# Patient Record
Sex: FEMALE | Race: WHITE | NOT HISPANIC OR LATINO | URBAN - METROPOLITAN AREA
[De-identification: names, ages, dates, MRNs, and addresses within clinical notes are randomized per-mention and may not be internally consistent; named-entity substitution may affect disease eponyms.]

---

## 2021-06-08 ENCOUNTER — INPATIENT (INPATIENT)
Facility: HOSPITAL | Age: 36
LOS: 2 days | Discharge: HOME | End: 2021-06-11
Attending: OBSTETRICS & GYNECOLOGY | Admitting: OBSTETRICS & GYNECOLOGY
Payer: COMMERCIAL

## 2021-06-09 VITALS
WEIGHT: 138.89 LBS | RESPIRATION RATE: 16 BRPM | HEIGHT: 61 IN | HEART RATE: 66 BPM | TEMPERATURE: 98 F | DIASTOLIC BLOOD PRESSURE: 80 MMHG | OXYGEN SATURATION: 100 % | SYSTOLIC BLOOD PRESSURE: 126 MMHG

## 2021-06-09 LAB
BASOPHILS # BLD AUTO: 0.04 K/UL — SIGNIFICANT CHANGE UP (ref 0–0.2)
BASOPHILS NFR BLD AUTO: 0.3 % — SIGNIFICANT CHANGE UP (ref 0–2)
BLD GP AB SCN SERPL QL: NEGATIVE — SIGNIFICANT CHANGE UP
COVID-19 SPIKE DOMAIN AB INTERP: POSITIVE
COVID-19 SPIKE DOMAIN ANTIBODY RESULT: 174 U/ML — HIGH
EOSINOPHIL # BLD AUTO: 0.05 K/UL — SIGNIFICANT CHANGE UP (ref 0–0.5)
EOSINOPHIL NFR BLD AUTO: 0.4 % — SIGNIFICANT CHANGE UP (ref 0–6)
HCT VFR BLD CALC: 37.5 % — SIGNIFICANT CHANGE UP (ref 34.5–45)
HGB BLD-MCNC: 13.3 G/DL — SIGNIFICANT CHANGE UP (ref 11.5–15.5)
IMM GRANULOCYTES NFR BLD AUTO: 0.4 % — SIGNIFICANT CHANGE UP (ref 0–1.5)
LYMPHOCYTES # BLD AUTO: 23.8 % — SIGNIFICANT CHANGE UP (ref 13–44)
LYMPHOCYTES # BLD AUTO: 3.1 K/UL — SIGNIFICANT CHANGE UP (ref 1–3.3)
MCHC RBC-ENTMCNC: 32 PG — SIGNIFICANT CHANGE UP (ref 27–34)
MCHC RBC-ENTMCNC: 35.5 GM/DL — SIGNIFICANT CHANGE UP (ref 32–36)
MCV RBC AUTO: 90.4 FL — SIGNIFICANT CHANGE UP (ref 80–100)
MONOCYTES # BLD AUTO: 0.98 K/UL — HIGH (ref 0–0.9)
MONOCYTES NFR BLD AUTO: 7.5 % — SIGNIFICANT CHANGE UP (ref 2–14)
NEUTROPHILS # BLD AUTO: 8.78 K/UL — HIGH (ref 1.8–7.4)
NEUTROPHILS NFR BLD AUTO: 67.6 % — SIGNIFICANT CHANGE UP (ref 43–77)
NRBC # BLD: 0 /100 WBCS — SIGNIFICANT CHANGE UP (ref 0–0)
PLATELET # BLD AUTO: 202 K/UL — SIGNIFICANT CHANGE UP (ref 150–400)
RBC # BLD: 4.15 M/UL — SIGNIFICANT CHANGE UP (ref 3.8–5.2)
RBC # FLD: 13 % — SIGNIFICANT CHANGE UP (ref 10.3–14.5)
RH IG SCN BLD-IMP: POSITIVE — SIGNIFICANT CHANGE UP
RH IG SCN BLD-IMP: POSITIVE — SIGNIFICANT CHANGE UP
SARS-COV-2 IGG+IGM SERPL QL IA: 174 U/ML — HIGH
SARS-COV-2 IGG+IGM SERPL QL IA: POSITIVE
T PALLIDUM AB TITR SER: NEGATIVE — SIGNIFICANT CHANGE UP
WBC # BLD: 13 K/UL — HIGH (ref 3.8–10.5)
WBC # FLD AUTO: 13 K/UL — HIGH (ref 3.8–10.5)

## 2021-06-09 RX ORDER — MAGNESIUM HYDROXIDE 400 MG/1
30 TABLET, CHEWABLE ORAL
Refills: 0 | Status: DISCONTINUED | OUTPATIENT
Start: 2021-06-09 | End: 2021-06-11

## 2021-06-09 RX ORDER — IBUPROFEN 200 MG
600 TABLET ORAL EVERY 6 HOURS
Refills: 0 | Status: COMPLETED | OUTPATIENT
Start: 2021-06-09 | End: 2022-05-08

## 2021-06-09 RX ORDER — OXYCODONE HYDROCHLORIDE 5 MG/1
5 TABLET ORAL
Refills: 0 | Status: DISCONTINUED | OUTPATIENT
Start: 2021-06-09 | End: 2021-06-11

## 2021-06-09 RX ORDER — LANOLIN
1 OINTMENT (GRAM) TOPICAL EVERY 6 HOURS
Refills: 0 | Status: DISCONTINUED | OUTPATIENT
Start: 2021-06-09 | End: 2021-06-11

## 2021-06-09 RX ORDER — AER TRAVELER 0.5 G/1
1 SOLUTION RECTAL; TOPICAL EVERY 4 HOURS
Refills: 0 | Status: DISCONTINUED | OUTPATIENT
Start: 2021-06-09 | End: 2021-06-11

## 2021-06-09 RX ORDER — ACETAMINOPHEN 500 MG
975 TABLET ORAL
Refills: 0 | Status: DISCONTINUED | OUTPATIENT
Start: 2021-06-09 | End: 2021-06-11

## 2021-06-09 RX ORDER — TETANUS TOXOID, REDUCED DIPHTHERIA TOXOID AND ACELLULAR PERTUSSIS VACCINE, ADSORBED 5; 2.5; 8; 8; 2.5 [IU]/.5ML; [IU]/.5ML; UG/.5ML; UG/.5ML; UG/.5ML
0.5 SUSPENSION INTRAMUSCULAR ONCE
Refills: 0 | Status: DISCONTINUED | OUTPATIENT
Start: 2021-06-09 | End: 2021-06-11

## 2021-06-09 RX ORDER — OXYTOCIN 10 UNIT/ML
20 VIAL (ML) INJECTION ONCE
Refills: 0 | Status: DISCONTINUED | OUTPATIENT
Start: 2021-06-09 | End: 2021-06-09

## 2021-06-09 RX ORDER — SIMETHICONE 80 MG/1
80 TABLET, CHEWABLE ORAL EVERY 4 HOURS
Refills: 0 | Status: DISCONTINUED | OUTPATIENT
Start: 2021-06-09 | End: 2021-06-11

## 2021-06-09 RX ORDER — BENZOCAINE 10 %
1 GEL (GRAM) MUCOUS MEMBRANE EVERY 6 HOURS
Refills: 0 | Status: DISCONTINUED | OUTPATIENT
Start: 2021-06-09 | End: 2021-06-11

## 2021-06-09 RX ORDER — IBUPROFEN 200 MG
600 TABLET ORAL EVERY 6 HOURS
Refills: 0 | Status: DISCONTINUED | OUTPATIENT
Start: 2021-06-09 | End: 2021-06-11

## 2021-06-09 RX ORDER — SODIUM CHLORIDE 9 MG/ML
1000 INJECTION, SOLUTION INTRAVENOUS
Refills: 0 | Status: DISCONTINUED | OUTPATIENT
Start: 2021-06-09 | End: 2021-06-09

## 2021-06-09 RX ORDER — OXYTOCIN 10 UNIT/ML
333.33 VIAL (ML) INJECTION
Qty: 20 | Refills: 0 | Status: DISCONTINUED | OUTPATIENT
Start: 2021-06-09 | End: 2021-06-09

## 2021-06-09 RX ORDER — OXYTOCIN 10 UNIT/ML
333.33 VIAL (ML) INJECTION
Qty: 20 | Refills: 0 | Status: DISCONTINUED | OUTPATIENT
Start: 2021-06-09 | End: 2021-06-11

## 2021-06-09 RX ORDER — FENTANYL/BUPIVACAINE/NS/PF 2MCG/ML-.1
250 PLASTIC BAG, INJECTION (ML) INJECTION
Refills: 0 | Status: DISCONTINUED | OUTPATIENT
Start: 2021-06-09 | End: 2021-06-11

## 2021-06-09 RX ORDER — SODIUM CHLORIDE 9 MG/ML
500 INJECTION, SOLUTION INTRAVENOUS
Refills: 0 | Status: DISCONTINUED | OUTPATIENT
Start: 2021-06-09 | End: 2021-06-11

## 2021-06-09 RX ORDER — DIBUCAINE 1 %
1 OINTMENT (GRAM) RECTAL EVERY 6 HOURS
Refills: 0 | Status: DISCONTINUED | OUTPATIENT
Start: 2021-06-09 | End: 2021-06-11

## 2021-06-09 RX ORDER — SODIUM CHLORIDE 9 MG/ML
3 INJECTION INTRAMUSCULAR; INTRAVENOUS; SUBCUTANEOUS EVERY 8 HOURS
Refills: 0 | Status: DISCONTINUED | OUTPATIENT
Start: 2021-06-09 | End: 2021-06-11

## 2021-06-09 RX ORDER — OXYTOCIN 10 UNIT/ML
1 VIAL (ML) INJECTION
Qty: 30 | Refills: 0 | Status: DISCONTINUED | OUTPATIENT
Start: 2021-06-09 | End: 2021-06-11

## 2021-06-09 RX ORDER — HYDROCORTISONE 1 %
1 OINTMENT (GRAM) TOPICAL EVERY 6 HOURS
Refills: 0 | Status: DISCONTINUED | OUTPATIENT
Start: 2021-06-09 | End: 2021-06-11

## 2021-06-09 RX ORDER — SODIUM CHLORIDE 9 MG/ML
500 INJECTION, SOLUTION INTRAVENOUS ONCE
Refills: 0 | Status: COMPLETED | OUTPATIENT
Start: 2021-06-09 | End: 2021-06-09

## 2021-06-09 RX ORDER — OXYCODONE HYDROCHLORIDE 5 MG/1
5 TABLET ORAL ONCE
Refills: 0 | Status: DISCONTINUED | OUTPATIENT
Start: 2021-06-09 | End: 2021-06-11

## 2021-06-09 RX ORDER — CITRIC ACID/SODIUM CITRATE 300-500 MG
15 SOLUTION, ORAL ORAL EVERY 6 HOURS
Refills: 0 | Status: DISCONTINUED | OUTPATIENT
Start: 2021-06-09 | End: 2021-06-09

## 2021-06-09 RX ORDER — DIPHENHYDRAMINE HCL 50 MG
25 CAPSULE ORAL EVERY 6 HOURS
Refills: 0 | Status: DISCONTINUED | OUTPATIENT
Start: 2021-06-09 | End: 2021-06-11

## 2021-06-09 RX ORDER — PRAMOXINE HYDROCHLORIDE 150 MG/15G
1 AEROSOL, FOAM RECTAL EVERY 4 HOURS
Refills: 0 | Status: DISCONTINUED | OUTPATIENT
Start: 2021-06-09 | End: 2021-06-11

## 2021-06-09 RX ORDER — OXYTOCIN 10 UNIT/ML
2 VIAL (ML) INJECTION
Qty: 30 | Refills: 0 | Status: DISCONTINUED | OUTPATIENT
Start: 2021-06-09 | End: 2021-06-09

## 2021-06-09 RX ADMIN — Medication 975 MILLIGRAM(S): at 19:00

## 2021-06-09 RX ADMIN — Medication 975 MILLIGRAM(S): at 18:10

## 2021-06-09 RX ADMIN — Medication 1 MILLIUNIT(S)/MIN: at 02:42

## 2021-06-09 RX ADMIN — SODIUM CHLORIDE 125 MILLILITER(S): 9 INJECTION, SOLUTION INTRAVENOUS at 01:00

## 2021-06-09 RX ADMIN — SODIUM CHLORIDE 3 MILLILITER(S): 9 INJECTION INTRAMUSCULAR; INTRAVENOUS; SUBCUTANEOUS at 22:00

## 2021-06-09 RX ADMIN — Medication 1000 MILLIUNIT(S)/MIN: at 17:01

## 2021-06-09 RX ADMIN — SODIUM CHLORIDE 1000 MILLILITER(S): 9 INJECTION, SOLUTION INTRAVENOUS at 11:33

## 2021-06-10 RX ADMIN — Medication 600 MILLIGRAM(S): at 09:30

## 2021-06-10 RX ADMIN — Medication 1 TABLET(S): at 11:36

## 2021-06-10 RX ADMIN — Medication 1 APPLICATION(S): at 06:01

## 2021-06-10 RX ADMIN — Medication 975 MILLIGRAM(S): at 07:00

## 2021-06-10 RX ADMIN — Medication 975 MILLIGRAM(S): at 12:00

## 2021-06-10 RX ADMIN — Medication 600 MILLIGRAM(S): at 03:40

## 2021-06-10 RX ADMIN — SODIUM CHLORIDE 3 MILLILITER(S): 9 INJECTION INTRAMUSCULAR; INTRAVENOUS; SUBCUTANEOUS at 06:00

## 2021-06-10 RX ADMIN — Medication 975 MILLIGRAM(S): at 23:12

## 2021-06-10 RX ADMIN — Medication 600 MILLIGRAM(S): at 22:42

## 2021-06-10 RX ADMIN — Medication 600 MILLIGRAM(S): at 15:30

## 2021-06-10 RX ADMIN — Medication 975 MILLIGRAM(S): at 12:30

## 2021-06-10 RX ADMIN — Medication 975 MILLIGRAM(S): at 06:00

## 2021-06-10 RX ADMIN — Medication 975 MILLIGRAM(S): at 18:00

## 2021-06-10 RX ADMIN — Medication 600 MILLIGRAM(S): at 21:37

## 2021-06-10 RX ADMIN — Medication 600 MILLIGRAM(S): at 09:00

## 2021-06-10 RX ADMIN — Medication 600 MILLIGRAM(S): at 15:00

## 2021-06-10 RX ADMIN — Medication 975 MILLIGRAM(S): at 18:30

## 2021-06-10 RX ADMIN — Medication 600 MILLIGRAM(S): at 02:40

## 2021-06-10 NOTE — DISCHARGE NOTE OB - CARE PROVIDER_API CALL
Laure Ta  OBSTETRICS AND GYNECOLOGY  [address]  [place] [address]  Phone: [phone]  Fax: [phone]  Follow Up Time:

## 2021-06-10 NOTE — PROGRESS NOTE ADULT - ASSESSMENT
A/P yo 35y  s/p , PP#1 , stable  1. Pain: OPM  2. GI: Reg  3. :  Voiding  4. DVT prophylaxis: SCDs, ambulation  5. Dispo: PP#1 or 2 unless otherwise specified

## 2021-06-10 NOTE — LACTATION INITIAL EVALUATION - NS LACT CON REASON FOR REQ
Met dyad at ~20 hours. Positioning and latch strategies taught, and baby was able to latch deeply, sucking rhythmically between periods of rest. Observed 20+ minutes on left, 5+ on right. Breastfeeding basics and normal  behavior reviewed, including cluster feeding/second night syndrome. Reviewed signs that baby is getting enough. Encouraged frequent SSC and to breastfeed in response to cues at least 8-12x/day. Answered all questions for mother and father. Encouraged to call for additional support as needed from RN or LC. Will refer for tele consult after discharge./primaparous mom

## 2021-06-10 NOTE — DISCHARGE NOTE OB - MATERIALS PROVIDED
Vaccinations/[facility]  Screening Program/  Immunization Record/Breastfeeding Log/Breastfeeding Mother’s Support Group Information/Guide to Postpartum Care/[facility] Hearing Screen Program/Back To Sleep Handout/Shaken Baby Prevention Handout/Birth Certificate Instructions

## 2021-06-10 NOTE — DISCHARGE NOTE OB - PATIENT PORTAL LINK FT
You can access the FollowMyHealth Patient Portal offered by [facility] by registering at the following website: [email or web address]. By joining [facility]’s FollowMyHealth portal, you will also be able to view your health information using other applications (apps) compatible with our system.

## 2021-06-10 NOTE — LACTATION INITIAL EVALUATION - LATCH
[facility] and follow-up neuropsych testing to guide treatment  Trial of guanfacine to manage behaviors and aggression. Follow-up established care provider for med refills and continued management  Follow-up [facility]-referred in-home therapy/services  Follow-up established care and services   normal latch

## 2021-06-11 VITALS
DIASTOLIC BLOOD PRESSURE: 65 MMHG | RESPIRATION RATE: 20 BRPM | TEMPERATURE: 98 F | HEART RATE: 73 BPM | SYSTOLIC BLOOD PRESSURE: 123 MMHG | OXYGEN SATURATION: 98 %

## 2021-06-11 PROCEDURE — 36415 COLL VENOUS BLD VENIPUNCTURE: CPT

## 2021-06-11 PROCEDURE — 59050 FETAL MONITOR W/REPORT: CPT

## 2021-06-11 PROCEDURE — 86901 BLOOD TYPING SEROLOGIC RH(D): CPT

## 2021-06-11 PROCEDURE — 86850 RBC ANTIBODY SCREEN: CPT

## 2021-06-11 PROCEDURE — 86780 TREPONEMA PALLIDUM: CPT

## 2021-06-11 PROCEDURE — 86769 SARS-COV-2 COVID-19 ANTIBODY: CPT

## 2021-06-11 PROCEDURE — 86900 BLOOD TYPING SEROLOGIC ABO: CPT

## 2021-06-11 PROCEDURE — 85025 COMPLETE CBC W/AUTO DIFF WBC: CPT

## 2021-06-11 RX ADMIN — Medication 600 MILLIGRAM(S): at 10:00

## 2021-06-11 RX ADMIN — Medication 975 MILLIGRAM(S): at 06:30

## 2021-06-11 RX ADMIN — Medication 975 MILLIGRAM(S): at 05:41

## 2021-06-11 RX ADMIN — Medication 1 TABLET(S): at 13:04

## 2021-06-11 RX ADMIN — Medication 975 MILLIGRAM(S): at 00:06

## 2021-06-11 RX ADMIN — Medication 975 MILLIGRAM(S): at 13:01

## 2021-06-11 RX ADMIN — Medication 600 MILLIGRAM(S): at 11:00

## 2021-06-11 RX ADMIN — Medication 600 MILLIGRAM(S): at 03:41

## 2021-06-11 RX ADMIN — Medication 600 MILLIGRAM(S): at 02:41

## 2021-06-11 RX ADMIN — Medication 975 MILLIGRAM(S): at 13:30

## 2021-06-11 NOTE — PROGRESS NOTE ADULT - SUBJECTIVE AND OBJECTIVE BOX
Patient evaluated at bedside.      She reports pain is well controlled with medications.     She has been ambulating without assistance, voiding spontaneously, tolerating solid food and PO fluids, and is breastfeeding.     She denies HA, dizziness, chest pain, palpitations, shortness of breath, n/v, heavy vaginal bleeding or perineal discomfort.    Physical Exam:  Vital Signs Last 24 Hrs  T(C): 36.7 (10 Mike 2021 06:01), Max: 37.3 (09 Jun 2021 16:30)  T(F): 98.1 (10 Mike 2021 06:01), Max: 99.2 (09 Jun 2021 16:30)  HR: 61 (10 Mike 2021 06:01) (61 - 86)  BP: 107/71 (10 Mike 2021 06:01) (104/85 - 125/69)  BP(mean): 75 (09 Jun 2021 17:15) (75 - 75)  RR: 18 (10 Mike 2021 06:01) (16 - 18)  SpO2: 98% (10 Mike 2021 06:01) (97% - 98%)    GA: NAD  Abd: + BS, soft, nontender, nondistended, no rebound or guarding, uterus firm at midline below the umbilicus  : lochia WNL  Extremities: no calf tenderness                          13.3   13.00 )-----------( 202      ( 09 Jun 2021 02:18 )             37.5               
Patient evaluated at bedside.      She reports pain is well controlled with medications.     She has been ambulating without assistance, voiding spontaneously, tolerating solid food and PO fluids, and is breastfeeding.     She denies HA, dizziness, chest pain, palpitations, shortness of breath, n/v, heavy vaginal bleeding or perineal discomfort.    Physical Exam:  Vital Signs Last 24 Hrs  T(C): 36.7 (10 Mike 2021 21:38), Max: 37 (10 Mike 2021 18:07)  T(F): 98.1 (10 Mike 2021 21:38), Max: 98.6 (10 Mike 2021 18:07)  HR: 65 (10 Mike 2021 21:38) (65 - 66)  BP: 125/82 (10 Mike 2021 21:38) (125/82 - 128/79)  RR: 18 (10 Mike 2021 21:38) (17 - 18)  SpO2: 98% (10 Mike 2021 21:38) (97% - 98%)    GA: NAD  Abd: + BS, soft, nontender, nondistended, no rebound or guarding, uterus firm at midline at the level of the umbilicus  : lochia WNL  Extremities: no calf tenderness

## 2021-06-11 NOTE — PROGRESS NOTE ADULT - ASSESSMENT
A/P yo 35y  s/p , PP# 2, stable  1. Pain: OPM  2. GI: Reg  3. :  Voiding  4. DVT prophylaxis: SCDs, ambulation  5. Dispo: PP#2 unless otherwise specified A/P yo 35y  s/p , PP# 2, stable  1. Pain: OPM  2. GI: Reg  3. :  Voiding  4. DVT prophylaxis: SCDs, ambulation  5. Dispo: PP#2 unless otherwise specified    Attending Addendum    Patient seen and examined, agree with assessment and plan.  PPD2 s/p , doing well, cleared for DC home today.    Dr. Leon, Attending

## 2021-06-16 DIAGNOSIS — Z3A.40 40 WEEKS GESTATION OF PREGNANCY: ICD-10-CM

## 2021-06-16 DIAGNOSIS — Z28.09 IMMUNIZATION NOT CARRIED OUT BECAUSE OF OTHER CONTRAINDICATION: ICD-10-CM

## 2021-06-16 DIAGNOSIS — Z34.03 ENCOUNTER FOR SUPERVISION OF NORMAL FIRST PREGNANCY, THIRD TRIMESTER: ICD-10-CM

## 2021-11-18 ENCOUNTER — NON-APPOINTMENT (OUTPATIENT)
Age: 36
End: 2021-11-18

## 2021-11-18 PROBLEM — Z00.00 ENCOUNTER FOR PREVENTIVE HEALTH EXAMINATION: Status: ACTIVE | Noted: 2021-11-18
